# Patient Record
Sex: MALE | Race: WHITE | Employment: FULL TIME | URBAN - NONMETROPOLITAN AREA
[De-identification: names, ages, dates, MRNs, and addresses within clinical notes are randomized per-mention and may not be internally consistent; named-entity substitution may affect disease eponyms.]

---

## 2017-09-11 LAB
AVERAGE GLUCOSE: NORMAL
HBA1C MFR BLD: 5.8 %

## 2019-01-21 LAB
CHOLESTEROL, TOTAL: 139 MG/DL
CHOLESTEROL/HDL RATIO: 3.2
HDLC SERPL-MCNC: 44 MG/DL (ref 35–70)
LDL CHOLESTEROL CALCULATED: 74 MG/DL (ref 0–160)
TRIGL SERPL-MCNC: 120 MG/DL
VLDLC SERPL CALC-MCNC: NORMAL MG/DL

## 2019-01-30 LAB
CREATININE, URINE: NORMAL
MICROALBUMIN/CREAT 24H UR: NORMAL MG/G{CREAT}
MICROALBUMIN/CREAT UR-RTO: 0.7

## 2019-07-08 ENCOUNTER — TELEPHONE (OUTPATIENT)
Dept: FAMILY MEDICINE CLINIC | Age: 64
End: 2019-07-08

## 2019-07-08 DIAGNOSIS — E78.5 HYPERLIPIDEMIA, UNSPECIFIED HYPERLIPIDEMIA TYPE: Primary | ICD-10-CM

## 2019-07-08 DIAGNOSIS — Z12.5 SCREENING PSA (PROSTATE SPECIFIC ANTIGEN): ICD-10-CM

## 2019-07-08 DIAGNOSIS — I10 ESSENTIAL HYPERTENSION: ICD-10-CM

## 2019-07-11 RX ORDER — PHENOL 1.4 %
AEROSOL, SPRAY (ML) MUCOUS MEMBRANE
COMMUNITY

## 2019-07-11 RX ORDER — AMLODIPINE BESYLATE 5 MG/1
5 TABLET ORAL DAILY
COMMUNITY
End: 2019-07-15 | Stop reason: SDUPTHER

## 2019-07-11 ASSESSMENT — ENCOUNTER SYMPTOMS
SHORTNESS OF BREATH: 0
CHEST TIGHTNESS: 0
ALLERGIC/IMMUNOLOGIC NEGATIVE: 1
ABDOMINAL PAIN: 0

## 2019-07-11 NOTE — PROGRESS NOTES
Genitourinary: Rectum normal and prostate normal.   Musculoskeletal: Normal range of motion. Lymphadenopathy:     He has no cervical adenopathy. Neurological: He is alert and oriented to person, place, and time. No cranial nerve deficit. Skin: Skin is warm and dry. No rash noted. Psychiatric: He has a normal mood and affect. Linda Osorio was seen today for hypertension and hyperlipidemia. Diagnoses and all orders for this visit:    Essential hypertension  Comments:  stable   Orders:  -     amLODIPine (NORVASC) 5 MG tablet; Take 1 tablet by mouth daily    Hyperlipidemia, unspecified hyperlipidemia type  Comments:  cholesterol 144    Orders:  -     Lipid Panel; Future  -     rosuvastatin (CRESTOR) 10 MG tablet; Take 1 tablet by mouth daily    Elevated liver enzymes  Comments:  returned to normal    Abnormal glucose  Comments:  inhouse a1c 5.7   pt to watch diet --work on wt loss  Orders:  -     Hemoglobin A1C; Future        Return in about 6 months (around 1/15/2020). Emelia Duran, DO   This note has been transcribed by Liz Keen under the direction of Dr. Kendall Gonzalez. Dr. Annetta Godinez has reviewed this note for accuracy. I, Dr. Annetta Godinez, personally performed the services described in this documentation as scribed by Liz Keen in my presence, and it is both accurate and complete.

## 2019-07-13 ENCOUNTER — HOSPITAL ENCOUNTER (OUTPATIENT)
Age: 64
Discharge: HOME | End: 2019-07-15
Payer: COMMERCIAL

## 2019-07-13 DIAGNOSIS — E78.5 HYPERLIPIDEMIA, UNSPECIFIED HYPERLIPIDEMIA TYPE: ICD-10-CM

## 2019-07-13 DIAGNOSIS — Z12.5 SCREENING PSA (PROSTATE SPECIFIC ANTIGEN): ICD-10-CM

## 2019-07-13 DIAGNOSIS — I10 ESSENTIAL HYPERTENSION: ICD-10-CM

## 2019-07-13 LAB
ALBUMIN SERPL-MCNC: 4.3 G/DL (ref 3.5–5.2)
ALP BLD-CCNC: 65 U/L (ref 40–129)
ALT SERPL-CCNC: 35 U/L (ref 0–40)
ANION GAP SERPL CALCULATED.3IONS-SCNC: 11 MMOL/L (ref 7–16)
AST SERPL-CCNC: 30 U/L (ref 0–39)
BASOPHILS ABSOLUTE: 0.04 E9/L (ref 0–0.2)
BASOPHILS RELATIVE PERCENT: 0.7 % (ref 0–2)
BILIRUB SERPL-MCNC: <0.2 MG/DL (ref 0–1.2)
BUN BLDV-MCNC: 17 MG/DL (ref 8–23)
CALCIUM SERPL-MCNC: 9.1 MG/DL (ref 8.6–10.2)
CHLORIDE BLD-SCNC: 106 MMOL/L (ref 98–107)
CHOLESTEROL, TOTAL: 144 MG/DL (ref 0–199)
CO2: 24 MMOL/L (ref 22–29)
CREAT SERPL-MCNC: 1.1 MG/DL (ref 0.7–1.2)
EOSINOPHILS ABSOLUTE: 0.13 E9/L (ref 0.05–0.5)
EOSINOPHILS RELATIVE PERCENT: 2.4 % (ref 0–6)
GFR AFRICAN AMERICAN: >60
GFR NON-AFRICAN AMERICAN: >60 ML/MIN/1.73
GLUCOSE BLD-MCNC: 112 MG/DL (ref 74–99)
HCT VFR BLD CALC: 46.6 % (ref 37–54)
HDLC SERPL-MCNC: 41 MG/DL
HEMOGLOBIN: 15.9 G/DL (ref 12.5–16.5)
IMMATURE GRANULOCYTES #: 0.01 E9/L
IMMATURE GRANULOCYTES %: 0.2 % (ref 0–5)
LDL CHOLESTEROL CALCULATED: 80 MG/DL (ref 0–99)
LYMPHOCYTES ABSOLUTE: 2.12 E9/L (ref 1.5–4)
LYMPHOCYTES RELATIVE PERCENT: 39.6 % (ref 20–42)
MCH RBC QN AUTO: 31 PG (ref 26–35)
MCHC RBC AUTO-ENTMCNC: 34.1 % (ref 32–34.5)
MCV RBC AUTO: 90.8 FL (ref 80–99.9)
MONOCYTES ABSOLUTE: 0.57 E9/L (ref 0.1–0.95)
MONOCYTES RELATIVE PERCENT: 10.7 % (ref 2–12)
NEUTROPHILS ABSOLUTE: 2.48 E9/L (ref 1.8–7.3)
NEUTROPHILS RELATIVE PERCENT: 46.4 % (ref 43–80)
PDW BLD-RTO: 11.9 FL (ref 11.5–15)
PLATELET # BLD: 247 E9/L (ref 130–450)
PMV BLD AUTO: 9.2 FL (ref 7–12)
POTASSIUM SERPL-SCNC: 4.3 MMOL/L (ref 3.5–5)
PROSTATE SPECIFIC ANTIGEN: 1.14 NG/ML (ref 0–4)
RBC # BLD: 5.13 E12/L (ref 3.8–5.8)
SODIUM BLD-SCNC: 141 MMOL/L (ref 132–146)
TOTAL PROTEIN: 7 G/DL (ref 6.4–8.3)
TRIGL SERPL-MCNC: 115 MG/DL (ref 0–149)
VLDLC SERPL CALC-MCNC: 23 MG/DL
WBC # BLD: 5.4 E9/L (ref 4.5–11.5)

## 2019-07-13 PROCEDURE — 85025 COMPLETE CBC W/AUTO DIFF WBC: CPT

## 2019-07-13 PROCEDURE — 36415 COLL VENOUS BLD VENIPUNCTURE: CPT

## 2019-07-13 PROCEDURE — G0103 PSA SCREENING: HCPCS

## 2019-07-13 PROCEDURE — 80053 COMPREHEN METABOLIC PANEL: CPT

## 2019-07-13 PROCEDURE — 80061 LIPID PANEL: CPT

## 2019-07-15 ENCOUNTER — OFFICE VISIT (OUTPATIENT)
Dept: FAMILY MEDICINE CLINIC | Age: 64
End: 2019-07-15
Payer: COMMERCIAL

## 2019-07-15 VITALS
WEIGHT: 257 LBS | DIASTOLIC BLOOD PRESSURE: 78 MMHG | BODY MASS INDEX: 35.98 KG/M2 | SYSTOLIC BLOOD PRESSURE: 134 MMHG | HEIGHT: 71 IN | RESPIRATION RATE: 16 BRPM | HEART RATE: 72 BPM

## 2019-07-15 DIAGNOSIS — I10 ESSENTIAL HYPERTENSION: Primary | ICD-10-CM

## 2019-07-15 DIAGNOSIS — R74.8 ELEVATED LIVER ENZYMES: ICD-10-CM

## 2019-07-15 DIAGNOSIS — R73.09 ABNORMAL GLUCOSE: ICD-10-CM

## 2019-07-15 DIAGNOSIS — E78.5 HYPERLIPIDEMIA, UNSPECIFIED HYPERLIPIDEMIA TYPE: ICD-10-CM

## 2019-07-15 PROCEDURE — 99214 OFFICE O/P EST MOD 30 MIN: CPT | Performed by: FAMILY MEDICINE

## 2019-07-15 RX ORDER — ROSUVASTATIN CALCIUM 10 MG/1
10 TABLET, COATED ORAL DAILY
Qty: 90 TABLET | Refills: 1 | Status: SHIPPED | OUTPATIENT
Start: 2019-07-15 | End: 2020-01-13 | Stop reason: SDUPTHER

## 2019-07-15 RX ORDER — AMLODIPINE BESYLATE 5 MG/1
5 TABLET ORAL DAILY
Qty: 90 TABLET | Refills: 1 | Status: SHIPPED | OUTPATIENT
Start: 2019-07-15 | End: 2020-01-13 | Stop reason: SDUPTHER

## 2019-07-15 RX ORDER — ROSUVASTATIN CALCIUM 10 MG/1
TABLET, COATED ORAL
COMMUNITY
Start: 2013-08-21 | End: 2019-07-15 | Stop reason: SDUPTHER

## 2019-07-15 ASSESSMENT — PATIENT HEALTH QUESTIONNAIRE - PHQ9
SUM OF ALL RESPONSES TO PHQ QUESTIONS 1-9: 0
SUM OF ALL RESPONSES TO PHQ9 QUESTIONS 1 & 2: 0
SUM OF ALL RESPONSES TO PHQ QUESTIONS 1-9: 0
1. LITTLE INTEREST OR PLEASURE IN DOING THINGS: 0
2. FEELING DOWN, DEPRESSED OR HOPELESS: 0

## 2020-01-09 ASSESSMENT — ENCOUNTER SYMPTOMS
CHEST TIGHTNESS: 0
ABDOMINAL PAIN: 0
BLOOD IN STOOL: 0
SHORTNESS OF BREATH: 0

## 2020-01-09 NOTE — PROGRESS NOTES
thyromegaly. Cardiovascular:      Rate and Rhythm: Normal rate and regular rhythm. Heart sounds: Normal heart sounds. Pulmonary:      Effort: Pulmonary effort is normal.      Breath sounds: Normal breath sounds. Abdominal:      General: Bowel sounds are normal.      Palpations: Abdomen is soft. There is no mass. Tenderness: There is no tenderness. Musculoskeletal: Normal range of motion. General: Tenderness (left thumb) present. Lymphadenopathy:      Cervical: No cervical adenopathy. Skin:     General: Skin is warm and dry. Findings: No rash. Neurological:      Mental Status: He is alert and oriented to person, place, and time. Cranial Nerves: No cranial nerve deficit. Assessment and Plan:  Sobeida Ding was seen today for hypertension and hyperlipidemia. Diagnoses and all orders for this visit:    Essential hypertension  Comments:  -stable, continue Amlodipine 5mg daily  Orders:  -     amLODIPine (NORVASC) 5 MG tablet; Take 1 tablet by mouth daily  -     CBC Auto Differential; Future  -     Comprehensive Metabolic Panel; Future  -     POCT Urinalysis no Micro    Hyperlipidemia, unspecified hyperlipidemia type  Comments:  Cholesterol: 112  Triglycerides:83  HDL:38  LDL: 57  --continue Crestor daily, watch low fat diet    Orders:  -     rosuvastatin (CRESTOR) 10 MG tablet; Take 1 tablet by mouth daily  -     Lipid Panel; Future    Type 2 diabetes mellitus with other specified complication, without long-term current use of insulin (HCC)  Comments:  a1c-6.0    -diet controlled, continue to watch carbs and sugars  Orders:  -     Hemoglobin A1C; Future    Screening for malignant neoplasm of prostate  -     Psa screening; Future          Return in about 6 months (around 7/13/2020). Seen By:  Jai Arnold, DO        This note has been transcribed by Ventura Iraheta under the direction of Dr. Elvin Pennington.     I, Dr. Christina Farr, personally performed the services

## 2020-01-11 ENCOUNTER — HOSPITAL ENCOUNTER (OUTPATIENT)
Age: 65
Discharge: HOME | End: 2020-01-13
Payer: COMMERCIAL

## 2020-01-11 LAB
CHOLESTEROL, TOTAL: 112 MG/DL (ref 0–199)
HBA1C MFR BLD: 6 % (ref 4–5.6)
HDLC SERPL-MCNC: 38 MG/DL
LDL CHOLESTEROL CALCULATED: 57 MG/DL (ref 0–99)
TRIGL SERPL-MCNC: 83 MG/DL (ref 0–149)
VLDLC SERPL CALC-MCNC: 17 MG/DL

## 2020-01-11 PROCEDURE — 36415 COLL VENOUS BLD VENIPUNCTURE: CPT

## 2020-01-11 PROCEDURE — 83036 HEMOGLOBIN GLYCOSYLATED A1C: CPT

## 2020-01-11 PROCEDURE — 80061 LIPID PANEL: CPT

## 2020-01-13 ENCOUNTER — TELEPHONE (OUTPATIENT)
Dept: FAMILY MEDICINE CLINIC | Age: 65
End: 2020-01-13

## 2020-01-13 ENCOUNTER — OFFICE VISIT (OUTPATIENT)
Dept: FAMILY MEDICINE CLINIC | Age: 65
End: 2020-01-13
Payer: COMMERCIAL

## 2020-01-13 VITALS
TEMPERATURE: 97.7 F | DIASTOLIC BLOOD PRESSURE: 82 MMHG | OXYGEN SATURATION: 97 % | HEART RATE: 74 BPM | RESPIRATION RATE: 16 BRPM | SYSTOLIC BLOOD PRESSURE: 130 MMHG | BODY MASS INDEX: 36.85 KG/M2 | WEIGHT: 264.2 LBS

## 2020-01-13 PROBLEM — E11.9 DIABETES MELLITUS (HCC): Status: ACTIVE | Noted: 2020-01-13

## 2020-01-13 LAB
BILIRUBIN, POC: NEGATIVE
BLOOD URINE, POC: NEGATIVE
CLARITY, POC: CLEAR
COLOR, POC: YELLOW
GLUCOSE URINE, POC: NEGATIVE
KETONES, POC: NEGATIVE
LEUKOCYTE EST, POC: NEGATIVE
NITRITE, POC: NEGATIVE
PH, POC: 5.5
PROTEIN, POC: NEGATIVE
SPECIFIC GRAVITY, POC: >=1.03
UROBILINOGEN, POC: 0.2

## 2020-01-13 PROCEDURE — 99214 OFFICE O/P EST MOD 30 MIN: CPT | Performed by: FAMILY MEDICINE

## 2020-01-13 PROCEDURE — 81002 URINALYSIS NONAUTO W/O SCOPE: CPT | Performed by: FAMILY MEDICINE

## 2020-01-13 RX ORDER — AMLODIPINE BESYLATE 5 MG/1
5 TABLET ORAL DAILY
Qty: 90 TABLET | Refills: 1 | Status: SHIPPED | OUTPATIENT
Start: 2020-01-13 | End: 2020-08-13 | Stop reason: SDUPTHER

## 2020-01-13 RX ORDER — ROSUVASTATIN CALCIUM 10 MG/1
10 TABLET, COATED ORAL DAILY
Qty: 90 TABLET | Refills: 1 | Status: SHIPPED | OUTPATIENT
Start: 2020-01-13

## 2020-01-13 ASSESSMENT — PATIENT HEALTH QUESTIONNAIRE - PHQ9
2. FEELING DOWN, DEPRESSED OR HOPELESS: 0
1. LITTLE INTEREST OR PLEASURE IN DOING THINGS: 0
SUM OF ALL RESPONSES TO PHQ QUESTIONS 1-9: 0
SUM OF ALL RESPONSES TO PHQ QUESTIONS 1-9: 0
SUM OF ALL RESPONSES TO PHQ9 QUESTIONS 1 & 2: 0

## 2020-03-15 ENCOUNTER — HOSPITAL ENCOUNTER (EMERGENCY)
Age: 65
Discharge: HOME | End: 2020-03-15
Payer: COMMERCIAL

## 2020-03-15 VITALS
WEIGHT: 255 LBS | SYSTOLIC BLOOD PRESSURE: 168 MMHG | HEIGHT: 71 IN | HEART RATE: 91 BPM | TEMPERATURE: 98.4 F | BODY MASS INDEX: 35.7 KG/M2 | RESPIRATION RATE: 17 BRPM | OXYGEN SATURATION: 97 % | DIASTOLIC BLOOD PRESSURE: 95 MMHG

## 2020-03-15 PROCEDURE — 99282 EMERGENCY DEPT VISIT SF MDM: CPT

## 2020-03-15 PROCEDURE — 12001 RPR S/N/AX/GEN/TRNK 2.5CM/<: CPT

## 2020-03-15 ASSESSMENT — PAIN SCALES - GENERAL: PAINLEVEL_OUTOF10: 4

## 2020-03-15 NOTE — ED PROVIDER NOTES
[facility]     Department of Emergency Medicine   ED  Provider Note  Admit Date/RoomTime: 3/15/2020 10:31 AM  ED Room: 30/30   Chief Complaint   Laceration (Right 4th finger laceration with saw- no loss of MSPs)    History of Present Illness      Micky Villanueva is a 60 y.o. old male presenting to the emergency department for a laceration to the left fourth finger caused by a saw. Patient states he was renovating his bathroom when the saw hit his right fourth finger. He is denying any pain at this time. He has full range of motion of affected finger and denies any numbness/tingling or sensation changes. Patient has no active bleeding. He is not on anticoagulation. He denies any other complaints or concerns at this ED visit. The patients tetanus status is unknown. He will be updated on today's visit. ROS   Pertinent positives and negatives are stated within HPI, all other systems reviewed and are negative. Past Medical History:  has a past medical history of Diabetes mellitus ([address] Hyperlipidemia, and Hypertension. Past Surgical History:  has a past surgical history that includes Colonoscopy. Social History:  reports that he has never smoked. He has never used smokeless tobacco. He reports that he does not drink alcohol or use drugs. Family History: family history includes Heart Attack in his father; Heart Disease in his father; High Cholesterol in his father; Other in his father and mother. Allergies: Patient has no known allergies. Allergies reviewed with patient     Physical Exam   VS:  BP (!) 168/95   Pulse 91   Temp 98.4 °F (36.9 °C)   Resp 17   Ht 5' 11\" (1.803 m)   Wt 255 lb (115.7 kg)   SpO2 97%   BMI 35.57 kg/m²      Oxygen Saturation Interpretation: Normal.    Constitutional:  Alert and oriented x4, development consistent with age. HEENT:  NC/NT. Airway patent. PERRLA, EOMI   Neck:  Normal ROM. Supple. Non-tender.   Extremities: 1.5cm irregular laceration to DIP joint of left fourth finger, no active bleeding, intact sensations distally, full range of motion of affected finger  Lymphatics: No lymphangitis or adenopathy noted. Neurological: CN II-XII grossly intact, Motor functions intact. Lab / Imaging Results   (All laboratory and radiology results have been personally reviewed by myself)  Labs:  No results found for this visit on 03/15/20. Imaging: All Radiology results interpreted by Radiologist unless otherwise noted. No orders to display       ED Course / Medical Decision Making     Medications - No data to display    Consult(s):  None    Procedure(s):  PROCEDURE NOTE      LACERATION REPAIR  Risks, benefits and alternatives (for applicable procedures below) described. Performed By: Florencio Baptiste PA-C. Laceration #: 1. Location: left 4th DIP, dorsal aspect  Length: 1.5cm. The wound area was irrigated with sterile saline, cleansed with povidone iodine, cleansend with shur-clens and draped in a sterile fashion. Local Anesthesia:  Lidocaine 1% without epinephrine. The wound was explored with the following results:  no foreign body or tendon injury seen. Flaps Aligned: yes. The wound was closed with 5-0 Ethilon using interrupted sutures. Dressing:  bacitracin and a sterile dressing was placed. Total number suture:  4. There were no additional lacerations requiring repair. MDM:     Counseling: The emergency provider has spoken with the patient/caregiver and discussed todays results, in addition to providing specific details for the plan of care and counseling regarding the diagnosis and prognosis. Questions are answered at this time and they are agreeable with the plan. Pt was educated on signs of infection and wound care. Pt was advised to return to ED if signs of infection or fever develop. Pt understands they must follow-up with PCP for suture removal and/or wound check.  Pt remained nontoxic, afebrile, and A&O x3 during this ED visit. They agreed with plan of care, discharge, and importance of follow-up. Pt was in no distress at discharge. Vitals stable. All results reviewed with pt and all questions answered. Patient was educated on newly prescribed medication. Assessment     1. Laceration of left ring finger without foreign body without damage to nail, initial encounter      Plan   Discharge to home  Patient condition is good    New Medications     Discharge Medication List as of 3/15/2020 11:26 AM        Electronically signed by Isra Davis PA-C   DD: 3/15/20  **This report was transcribed using voice recognition software. Every effort was made to ensure accuracy; however, inadvertent computerized transcription errors may be present.     END OF ED PROVIDER NOTE        Isra Davis PA-C  03/15/20 0427

## 2020-03-25 ENCOUNTER — OFFICE VISIT (OUTPATIENT)
Dept: FAMILY MEDICINE CLINIC | Age: 65
End: 2020-03-25
Payer: COMMERCIAL

## 2020-03-25 VITALS
WEIGHT: 263 LBS | HEART RATE: 95 BPM | DIASTOLIC BLOOD PRESSURE: 80 MMHG | SYSTOLIC BLOOD PRESSURE: 132 MMHG | OXYGEN SATURATION: 99 % | BODY MASS INDEX: 36.68 KG/M2 | TEMPERATURE: 98 F

## 2020-03-25 PROCEDURE — 99213 OFFICE O/P EST LOW 20 MIN: CPT | Performed by: FAMILY MEDICINE

## 2020-03-25 ASSESSMENT — ENCOUNTER SYMPTOMS: COLOR CHANGE: 0

## 2020-03-25 NOTE — PROGRESS NOTES
3/25/2020  Chief Complaint   Patient presents with    Suture / Staple Removal    Hypertension     Patient here for suture removal. Laceration 4th digit left hand, 4 sutures removed. HPI Denies chest pain, edema, fatigue, palpitations and syncope. Compliance reviewed. No medication side effects noted. Review of Systems   Musculoskeletal: Negative for joint swelling. Skin: Negative for color change and wound. Objective  Vitals:    03/25/20 1440   BP: 132/80   Pulse: 95   Temp: 98 °F (36.7 °C)   SpO2: 99%   Weight: 263 lb (119.3 kg)        Physical Exam  Constitutional:       Appearance: Normal appearance. Musculoskeletal:      Comments: LACERATION WELL-HEALED --NO SIGN OF INFECTION   Neurological:      Mental Status: He is alert. Dayton Vasquez was seen today for suture / staple removal and hypertension. Diagnoses and all orders for this visit:    Encounter for removal of sutures  Comments:  4 sutures removed. Advised to keep clean, apply neosporin, band aid applied. Return if symptoms worsen or fail to improve. I have reviewed the chief complaint and history of present illness (including ROS and PFSH) and vital documentation by my staff and I agree with their documentation and have added where applicable.      Ssuana Climes, DO

## 2020-08-13 ENCOUNTER — OFFICE VISIT (OUTPATIENT)
Dept: FAMILY MEDICINE CLINIC | Age: 65
End: 2020-08-13
Payer: COMMERCIAL

## 2020-08-13 VITALS
HEART RATE: 92 BPM | SYSTOLIC BLOOD PRESSURE: 130 MMHG | OXYGEN SATURATION: 97 % | DIASTOLIC BLOOD PRESSURE: 78 MMHG | WEIGHT: 255 LBS | TEMPERATURE: 97.3 F | HEIGHT: 72 IN | BODY MASS INDEX: 34.54 KG/M2

## 2020-08-13 PROCEDURE — 96372 THER/PROPH/DIAG INJ SC/IM: CPT | Performed by: INTERNAL MEDICINE

## 2020-08-13 PROCEDURE — 99213 OFFICE O/P EST LOW 20 MIN: CPT | Performed by: INTERNAL MEDICINE

## 2020-08-13 RX ORDER — ROSUVASTATIN CALCIUM 10 MG/1
10 TABLET, COATED ORAL DAILY
Qty: 90 TABLET | Refills: 1 | Status: CANCELLED | OUTPATIENT
Start: 2020-08-13

## 2020-08-13 RX ORDER — PREDNISONE 10 MG/1
TABLET ORAL
Qty: 12 TABLET | Refills: 0 | Status: SHIPPED | OUTPATIENT
Start: 2020-08-13 | End: 2020-08-19

## 2020-08-13 RX ORDER — AMLODIPINE BESYLATE 5 MG/1
5 TABLET ORAL DAILY
Qty: 90 TABLET | Refills: 0 | Status: SHIPPED | OUTPATIENT
Start: 2020-08-13

## 2020-08-13 RX ORDER — METHYLPREDNISOLONE ACETATE 40 MG/ML
40 INJECTION, SUSPENSION INTRA-ARTICULAR; INTRALESIONAL; INTRAMUSCULAR; SOFT TISSUE ONCE
Status: COMPLETED | OUTPATIENT
Start: 2020-08-13 | End: 2020-08-13

## 2020-08-13 RX ADMIN — METHYLPREDNISOLONE ACETATE 40 MG: 40 INJECTION, SUSPENSION INTRA-ARTICULAR; INTRALESIONAL; INTRAMUSCULAR; SOFT TISSUE at 15:07

## 2020-08-13 ASSESSMENT — ENCOUNTER SYMPTOMS: SHORTNESS OF BREATH: 0

## 2020-08-13 NOTE — PROGRESS NOTES
[id number]     20  Terry Garza : 1955 Sex: male  Age: 63 y.o. Chief Complaint   Patient presents with   [facility]       HPI    Patient presents today with 2-week history of poison ivy states he knows the exposure. States it is very itchy. No one else around him has it. States he has it to arms legs abdomen and chest.  Also is asking for a refill of his antihypertensive medication amlodipine as he will not see his PCP until next month. Current physician is not currently practicing but will be reopening next month. States blood pressures been in a good range. Review of Systems   Constitutional: Negative for chills and fever. Respiratory: Negative for shortness of breath. Cardiovascular: Negative for chest pain. Skin: Positive for rash. REST OF PERTINENT ROS GONE OVER AND WAS NEGATIVE. Current Outpatient Medications:     amLODIPine (NORVASC) 5 MG tablet, Take 1 tablet by mouth daily, Disp: 90 tablet, Rfl: 0    predniSONE (DELTASONE) 10 MG tablet, Take 3 tablets by mouth daily for 2 days, THEN 2 tablets daily for 2 days, THEN 1 tablet daily for 2 days. , Disp: 12 tablet, Rfl: 0    rosuvastatin (CRESTOR) 10 MG tablet, Take 1 tablet by mouth daily, Disp: 90 tablet, Rfl: 1    Multiple Vitamins-Minerals (MULTIVITAMIN ADULTS 50+) TABS, Take by mouth, Disp: , Rfl:     vitamin D3 (CHOLECALCIFEROL) 5000 units TABS tablet, Take 5,000 Units by mouth daily, Disp: , Rfl:     aspirin 81 MG tablet, Take 81 mg by mouth daily. , Disp: , Rfl:   No Known Allergies    Past Medical History:   Diagnosis Date    Diabetes mellitus ([address] 2020    Hyperlipidemia     Hypertension      Past Surgical History:   Procedure Laterality Date    COLONOSCOPY       Family History   Problem Relation Age of Onset    Other Mother     Heart Disease Father     Other Father         MI    Heart Attack Father     High Cholesterol Father      Social History Socioeconomic History    Marital status:      Spouse name: Not on file    Number of children: Not on file    Years of education: Not on file    Highest education level: Not on file   Occupational History    Not on file   Social Needs    Financial resource strain: Not on file    Food insecurity     Worry: Not on file     Inability: Not on file    Transportation needs     Medical: Not on file     Non-medical: Not on file   Tobacco Use    Smoking status: Never Smoker    Smokeless tobacco: Never Used   Substance and Sexual Activity    Alcohol use: No     Alcohol/week: 0.0 standard drinks    Drug use: No    Sexual activity: Not on file   Lifestyle    Physical activity     Days per week: Not on file     Minutes per session: Not on file    Stress: Not on file   Relationships    Social connections     Talks on phone: Not on file     Gets together: Not on file     Attends [employer] service: Not on file     Active member of club or organization: Not on file     Attends meetings of clubs or organizations: Not on file     Relationship status: Not on file    Intimate partner violence     Fear of current or ex partner: Not on file     Emotionally abused: Not on file     Physically abused: Not on file     Forced sexual activity: Not on file   Other Topics Concern    Not on file   Social History Narrative    Not on file       Vitals:    08/13/20 1443   BP: 130/78   Pulse: 92   Temp: 97.3 °F (36.3 °C)   TempSrc: Temporal   SpO2: 97%   Weight: 255 lb (115.7 kg)   Height: 6' (1.829 m)       Physical Exam  Constitutional:       General: He is not in acute distress. Skin:     General: Skin is warm and dry. Findings: Rash present. Neurological:      Mental Status: He is alert. Psychiatric:         Mood and Affect: Mood normal.         Behavior: Behavior normal.         Thought Content:  Thought content normal.         Judgment: Judgment normal.                   Assessment and Plan:  Rajeev Mae was seen today for poison ivy. Diagnoses and all orders for this visit:    Poison ivy    Essential hypertension  Comments:  -stable, continue Amlodipine 5mg daily  Orders:  -     amLODIPine (NORVASC) 5 MG tablet; Take 1 tablet by mouth daily    Other orders  -     predniSONE (DELTASONE) 10 MG tablet; Take 3 tablets by mouth daily for 2 days, THEN 2 tablets daily for 2 days, THEN 1 tablet daily for 2 days. -     methylPREDNISolone acetate (DEPO-MEDROL) injection 40 mg    Plan: Shot of methylprednisolone 40 mg.  Prednisone taper dose. Prescription for his amlodipine. Monitor blood pressure closely. Follow-up with PCP next month. Notify us of problems in the interim. Return fu pcp. Seen By:  Levi Rudolph MD      *Document was created using voice recognition software. Note was reviewed however may contain grammatical errors.

## 2020-08-25 ENCOUNTER — OFFICE VISIT (OUTPATIENT)
Dept: FAMILY MEDICINE CLINIC | Age: 65
End: 2020-08-25
Payer: COMMERCIAL

## 2020-08-25 VITALS
BODY MASS INDEX: 34.54 KG/M2 | WEIGHT: 255 LBS | OXYGEN SATURATION: 96 % | TEMPERATURE: 98 F | HEART RATE: 94 BPM | HEIGHT: 72 IN | RESPIRATION RATE: 18 BRPM

## 2020-08-25 PROCEDURE — 96372 THER/PROPH/DIAG INJ SC/IM: CPT | Performed by: FAMILY MEDICINE

## 2020-08-25 PROCEDURE — 99213 OFFICE O/P EST LOW 20 MIN: CPT | Performed by: FAMILY MEDICINE

## 2020-08-25 RX ORDER — TRIAMCINOLONE ACETONIDE 40 MG/ML
80 INJECTION, SUSPENSION INTRA-ARTICULAR; INTRAMUSCULAR ONCE
Status: COMPLETED | OUTPATIENT
Start: 2020-08-25 | End: 2020-08-25

## 2020-08-25 RX ORDER — METHYLPREDNISOLONE 4 MG/1
TABLET ORAL
Qty: 1 KIT | Refills: 0 | Status: SHIPPED | OUTPATIENT
Start: 2020-08-25

## 2020-08-25 RX ADMIN — TRIAMCINOLONE ACETONIDE 80 MG: 40 INJECTION, SUSPENSION INTRA-ARTICULAR; INTRAMUSCULAR at 14:51

## 2020-08-25 ASSESSMENT — ENCOUNTER SYMPTOMS
COLOR CHANGE: 1
EYES NEGATIVE: 1
RESPIRATORY NEGATIVE: 1

## 2020-08-25 NOTE — PROGRESS NOTES
20  Frances Deshpande : 1955 Sex: male  Age: 63 y.o. Chief Complaint   Patient presents with   [facility]     on stomach. was in before no better       This patient was in about a week ago is a 37-year-old white male with a diagnosis at the time of poison ivy. He presents again today with evidence of poison oak and it is unresponsive to the last treatment probably under dosage. But he has lesions on his chest and abdomen and both upper extremities. Review of Systems   Constitutional: Negative. Eyes: Negative. Respiratory: Negative. Cardiovascular: Negative. Skin: Positive for color change and rash. Current Outpatient Medications:     methylPREDNISolone (MEDROL DOSEPACK) 4 MG tablet, Take by mouth., Disp: 1 kit, Rfl: 0    amLODIPine (NORVASC) 5 MG tablet, Take 1 tablet by mouth daily, Disp: 90 tablet, Rfl: 0    rosuvastatin (CRESTOR) 10 MG tablet, Take 1 tablet by mouth daily, Disp: 90 tablet, Rfl: 1    Multiple Vitamins-Minerals (MULTIVITAMIN ADULTS 50+) TABS, Take by mouth, Disp: , Rfl:     vitamin D3 (CHOLECALCIFEROL) 5000 units TABS tablet, Take 5,000 Units by mouth daily, Disp: , Rfl:     aspirin 81 MG tablet, Take 81 mg by mouth daily. , Disp: , Rfl:   No Known Allergies    Past Medical History:   Diagnosis Date    Diabetes mellitus ([address] 2020    Hyperlipidemia     Hypertension      Past Surgical History:   Procedure Laterality Date    COLONOSCOPY       Family History   Problem Relation Age of Onset    Other Mother     Heart Disease Father     Other Father         MI    Heart Attack Father     High Cholesterol Father      Social History     Tobacco Use    Smoking status: Never Smoker    Smokeless tobacco: Never Used   Substance Use Topics    Alcohol use: No     Alcohol/week: 0.0 standard drinks    Drug use: No        Vitals:    20 1426   Pulse: 94   Resp: 18   Temp: 98 °F (36.7 °C)   TempSrc: Temporal   SpO2: 96%   Weight: 255 lb (115.7 kg)   Height: 6' (1.829 m)       Physical Exam  Vitals signs reviewed. Constitutional:       Appearance: Normal appearance. HENT:      Head: Normocephalic and atraumatic. Cardiovascular:      Rate and Rhythm: Normal rate and regular rhythm. Heart sounds: No murmur. Pulmonary:      Effort: Pulmonary effort is normal.      Breath sounds: Normal breath sounds. Skin:     Findings: Erythema and rash present. Neurological:      Mental Status: He is alert. Assessment and Plan:  Nelson Sanchez was seen today for poison ivy. Diagnoses and all orders for this visit:    Poison ivy    Poison oak dermatitis    Other orders  -     triamcinolone acetonide (KENALOG-40) injection 80 mg  -     methylPREDNISolone (MEDROL DOSEPACK) 4 MG tablet; Take by mouth. Orders Placed This Encounter   Medications    triamcinolone acetonide (KENALOG-40) injection 80 mg    methylPREDNISolone (MEDROL DOSEPACK) 4 MG tablet     Sig: Take by mouth. Dispense:  1 kit     Refill:  0        Patient advised to follow up with PCP as needed.         Seen By:  Maurisio Olmstead DO

## 2021-07-10 ENCOUNTER — HOSPITAL ENCOUNTER (EMERGENCY)
Age: 66
Discharge: HOME | End: 2021-07-10
Attending: EMERGENCY MEDICINE
Payer: COMMERCIAL

## 2021-07-10 VITALS
HEIGHT: 72 IN | HEART RATE: 84 BPM | BODY MASS INDEX: 33.86 KG/M2 | DIASTOLIC BLOOD PRESSURE: 91 MMHG | TEMPERATURE: 97.6 F | RESPIRATION RATE: 14 BRPM | SYSTOLIC BLOOD PRESSURE: 156 MMHG | OXYGEN SATURATION: 98 % | WEIGHT: 250 LBS

## 2021-07-10 DIAGNOSIS — S66.822A EXTENSOR TENDON LACERATION OF LEFT HAND WITH OPEN WOUND, INITIAL ENCOUNTER: Primary | ICD-10-CM

## 2021-07-10 DIAGNOSIS — T14.8XXA BLOOD VESSEL INJURY: ICD-10-CM

## 2021-07-10 DIAGNOSIS — S61.402A EXTENSOR TENDON LACERATION OF LEFT HAND WITH OPEN WOUND, INITIAL ENCOUNTER: Primary | ICD-10-CM

## 2021-07-10 PROCEDURE — 12002 RPR S/N/AX/GEN/TRNK2.6-7.5CM: CPT

## 2021-07-10 PROCEDURE — 99283 EMERGENCY DEPT VISIT LOW MDM: CPT

## 2021-07-10 RX ORDER — NAPROXEN 500 MG/1
500 TABLET ORAL 2 TIMES DAILY
Qty: 60 TABLET | Refills: 0 | Status: SHIPPED | OUTPATIENT
Start: 2021-07-10

## 2021-07-10 RX ORDER — CEPHALEXIN 500 MG/1
500 CAPSULE ORAL 4 TIMES DAILY
Qty: 20 CAPSULE | Refills: 0 | Status: SHIPPED | OUTPATIENT
Start: 2021-07-10 | End: 2021-07-15

## 2021-07-10 NOTE — ED NOTES
Dr Oscar Nathan, RM PA-C at bedside at bedside     [facility] [place]  07/10/21 4503

## 2021-07-10 NOTE — ED PROVIDER NOTES
ED Attending  CC: Katie     Department of Emergency Medicine   ED  Provider Note  Admit Date/RoomTime: 7/10/2021 11:49 AM  ED Room: 33/33     Chief Complaint   Laceration (left hand)    History of Present Illness      Fred Cedeño is a 63 y.o. old male presenting to the emergency department for a laceration to the left hand caused by razor blade knife. Patient states he was cutting a zip tie when the knife slipped and hit him in the left thenar region. He states there was initially a significant amount of bleeding that he was able to control with pressure and elevation at home. He presents today with moderate bleeding. He has 5 cm laceration to left thenar region. He has full range of motion of all fingers and wrist of affected extremity. He denies any numbness/tingling or sensation changes. Patient has intact sensations distally. Patient is not on anticoagulation. He is alert and oriented x3 and in no apparent distress at this exam.  Patient is nontoxic-appearing. The patients tetanus status is within past 5 years. ROS   Pertinent positives and negatives are stated within HPI, all other systems reviewed and are negative. Past Medical History:  has a past medical history of Diabetes mellitus ([facility] [address] Hyperlipidemia, and Hypertension. Past Surgical History:  has a past surgical history that includes Colonoscopy. Social History:  reports that he has never smoked. He has never used smokeless tobacco. He reports that he does not drink alcohol and does not use drugs. Family History: family history includes Heart Attack in his father; Heart Disease in his father; High Cholesterol in his father; Other in his father and mother. Allergies: Patient has no known allergies.   Allergies reviewed with patient     Physical Exam   VS:  BP (!) 156/91   Pulse 84   Temp 97.6 °F (36.4 °C)   Resp 14   Ht 6' (1.829 m)   Wt 250 lb (113.4 kg)   SpO2 98%   BMI 33.91 kg/m²      Oxygen Saturation Interpretation: Normal.    Constitutional:  Alert and oriented x4, development consistent with age. HEENT:  NC/NT. Airway patent. PERRLA, EOMI   Neck:  Normal ROM. Supple. Non-tender. CVS: Regular rate for age, normal rhythm  Resp: Clear and equal bilaterally with good airflow, no respiratory distress, 98% room air   Extremities: 5cm laceration to left thenar region with mild adipose tissue exposure and small potential muscle tear, small vessel with spurting blood noted during wound exploration and after numbing, intact sensations distally, full ROM all 5 fingers and wrist of affected finger, capillary refill <3 seconds x 5 fingers    Lymphatics: No lymphangitis or adenopathy noted. Neurological: CN II-XII grossly intact, Motor functions intact. Lab / Imaging Results   (All laboratory and radiology results have been personally reviewed by myself)  Labs:  No results found for this visit on 07/10/21. Imaging: All Radiology results interpreted by Radiologist unless otherwise noted. No orders to display     ED Course / Medical Decision Making   Medications - No data to display    Consult(s):  None    Procedure(s):  PROCEDURE NOTE      LACERATION REPAIR  Risks, benefits and alternatives (for applicable procedures below) described. Performed By: Eliseo Mix PA-C and Dagmar Rivera     Laceration #: 1. Location: left thenar region/ left hand   Length: 5cm. The wound area was irrigated with sterile saline, cleansed with povidone iodine, cleansend with shur-clens and draped in a sterile fashion. Local Anesthesia:  Lidocaine 1% with and without epinephrine. Wound initially anesthestized without epi, however, wound began to have significant bleeding so epi was used to finish    The wound was explored with the following results: no foreign body or tendon injury seen, vessel injury repaired by Dr. Yessica Kessler: yes.   The wound was closed with 5-0 Ethilon using interrupted sutures and 5-0 Vicryl using running sutures. Dressing:  bacitracin, a sterile dressing and ACE wrap was placed. Total number suture:  10 external, 3 internal     There were no additional lacerations requiring repair. Patient did have episode of 30 second LOC during suture repair. He was reclined back with legs up and head down. Patient awoke independently within 30 seconds. He had no chest pain or SOB. Patient alert and oriented x3 upon discharge        MDM:     Patient understands he must follow-up with ortho and PCP for wound check and re-evaluation of vessel injury     Counseling: The emergency provider has spoken with the patient/caregiver and discussed todays results, in addition to providing specific details for the plan of care and counseling regarding the diagnosis and prognosis. Questions are answered at this time and they are agreeable with the plan. Patient was educated on signs of infection and wound care. Patient was advised to return to ED if signs of infection or fever develop. Patient understands they must follow-up with PCP for suture removal and/or wound check. Patient remained nontoxic, afebrile, and A&O x3 during this ED visit. They agreed with plan of care, discharge, and importance of follow-up. Patient was in no distress at discharge. Vitals stable. All results reviewed with pt and all questions answered. Patient was educated on newly prescribed medication. Patient educated on wound care. Assessment     1. Extensor tendon laceration of left hand with open wound, initial encounter    2.  Blood vessel injury      Plan   Discharge to home  Patient condition is good    New Medications     Discharge Medication List as of 7/10/2021  1:27 PM      START taking these medications    Details   naproxen (NAPROSYN) 500 MG tablet Take 1 tablet by mouth 2 times daily, Disp-60 tablet, R-0Print      cephALEXin (KEFLEX) 500 MG capsule Take 1 capsule by mouth 4 times daily for 5 days, Disp-20 capsule, R-0Print Electronically signed by Tian Loja PA-C   DD: 7/10/21    **This report was transcribed using voice recognition software. Every effort was made to ensure accuracy; however, inadvertent computerized transcription errors may be present.     END OF ED PROVIDER NOTE        Tian Loja PA-C  07/10/21 9314

## 2023-11-04 ENCOUNTER — APPOINTMENT (OUTPATIENT)
Dept: CT IMAGING | Age: 68
End: 2023-11-04
Payer: COMMERCIAL

## 2023-11-04 ENCOUNTER — HOSPITAL ENCOUNTER (EMERGENCY)
Age: 68
Discharge: HOME | End: 2023-11-04
Payer: COMMERCIAL

## 2023-11-04 VITALS
HEART RATE: 95 BPM | TEMPERATURE: 97.9 F | RESPIRATION RATE: 14 BRPM | DIASTOLIC BLOOD PRESSURE: 91 MMHG | OXYGEN SATURATION: 99 % | SYSTOLIC BLOOD PRESSURE: 132 MMHG

## 2023-11-04 DIAGNOSIS — S01.01XA LACERATION OF SCALP, INITIAL ENCOUNTER: ICD-10-CM

## 2023-11-04 DIAGNOSIS — S09.90XA INJURY OF HEAD, INITIAL ENCOUNTER: Primary | ICD-10-CM

## 2023-11-04 PROCEDURE — 12002 RPR S/N/AX/GEN/TRNK2.6-7.5CM: CPT

## 2023-11-04 PROCEDURE — 99284 EMERGENCY DEPT VISIT MOD MDM: CPT

## 2023-11-04 PROCEDURE — 70450 CT HEAD/BRAIN W/O DYE: CPT

## 2023-11-04 RX ORDER — LIDOCAINE HYDROCHLORIDE 10 MG/ML
5 INJECTION, SOLUTION EPIDURAL; INFILTRATION; INTRACAUDAL; PERINEURAL ONCE
Status: DISCONTINUED | OUTPATIENT
Start: 2023-11-04 | End: 2023-11-04 | Stop reason: HOSPADM